# Patient Record
(demographics unavailable — no encounter records)

---

## 2025-07-29 NOTE — PLAN
[FreeTextEntry1] : Patient here for an annual well woman encounter. hx of small fibroid uterus  Left invasive ductal cell carcinoma in 10/2024. RT "precancer" s/p Double mastectomy with reconstruction in 03/2025, currently on anastrozole and Kadcyla Patient is BRCA negative. Patient's gynecological exam with atrophy of vagina. Pap smear was done. TVS was done showing a diffusely calcified myometrium and stable intramural myoma, atrophic EM Patient recommended to use OTC Astroglide or K-Y Jelly for vaginal dryness. Patient to follow-up in 6 months for reevaluation.  PHQ-9 questionnaire reviewed. Patient scored 0. No intervention needed.  I, Meagan Magallanes, acted solely as a scribe for Dr. Damien Fleming on this date 07/29/2025.   All medical record entries made by the Scribe were at my, Dr. Damien Fleming, direction on 07/29/2025. I have reviewed the chart and agree that the record accurately reflects my personal performance of the history, physical exam, assessment and plan.

## 2025-07-29 NOTE — PHYSICAL EXAM
[Chaperoned Physical Exam] : A chaperone was present in the examining room during all aspects of the physical examination. [MA] : MA [Appropriately responsive] : appropriately responsive [Alert] : alert [No Acute Distress] : no acute distress [No Lymphadenopathy] : no lymphadenopathy [Non-distended] : non-distended [No Lesions] : no lesions [No Mass] : no mass [Normal] : normal [Atrophy] : atrophy [Uterine Adnexae] : non-palpable [FreeTextEntry2] : Karian Fraga [FreeTextEntry3] : No thyromegaly  [FreeTextEntry6] : No masses, no axillary adenopathy, and no nipple discharge, s/p bilateral mastectomy, Right upper chest wall with port is palpable subcutaneously   [FreeTextEntry7] : Soft, non-tender, no adenopathy, no HSM, midabdominal transverse incision from reconstruction, Laparoscopic cholecystectomy   [FreeTextEntry9] : Normal, guaiac negative

## 2025-07-29 NOTE — HISTORY OF PRESENT ILLNESS
[FreeTextEntry1] : Patient is a 54-year-old, Para 2, here for an annual well woman encounter without complaints. other than vaginal  drymess Pt is  s/p bilat mastectomy with  reconstruction ffor left invasive ductal cell Ca and RT " precancer", followed by chemotherapy, anastrozole and immunotherapy. . Denies any vaginal discharge, itching and burning.  Denies hot flashes and night sweats.  Patient is being followed by Dr. Denis for breast care. Patients last colonoscopy was in 2016.  Patients last bone densitometry in 2025 with osteopenia of left hip.  Medications: Losartan, Anastrozole, Vitamin D3, Vitamin B12, Magnesium, Kadcyla for HER2+ Allergies: NKDA PMHx:             Patient is BRCA negative              Small fibroid uterus              REESE-II in 1997             8 rounds of TCPH chemotherapy FHx: Maternal hx of diabetes          Maternal grandfather with hx of prostate cancer PSHx: s/p uterine fibroid embolization in 2019            s/p Double mastectomy with reconstruction in 03/2025            Revision of reconstruction 07/2025            Laparoscopic cholecystectomy